# Patient Record
Sex: MALE | Race: WHITE | URBAN - METROPOLITAN AREA
[De-identification: names, ages, dates, MRNs, and addresses within clinical notes are randomized per-mention and may not be internally consistent; named-entity substitution may affect disease eponyms.]

---

## 2019-10-22 ENCOUNTER — OFFICE VISIT (OUTPATIENT)
Dept: ONCOLOGY | Age: 39
End: 2019-10-22

## 2019-10-22 VITALS
DIASTOLIC BLOOD PRESSURE: 81 MMHG | TEMPERATURE: 97.9 F | HEIGHT: 74 IN | HEART RATE: 70 BPM | BODY MASS INDEX: 34.01 KG/M2 | WEIGHT: 265 LBS | RESPIRATION RATE: 16 BRPM | OXYGEN SATURATION: 97 % | SYSTOLIC BLOOD PRESSURE: 123 MMHG

## 2019-10-22 DIAGNOSIS — Z86.718 HISTORY OF DVT OF LOWER EXTREMITY: ICD-10-CM

## 2019-10-22 DIAGNOSIS — Z82.49 FAMILY HISTORY OF DVT: ICD-10-CM

## 2019-10-22 DIAGNOSIS — I82.811 ACUTE SUPERFICIAL VENOUS THROMBOSIS OF RIGHT LOWER EXTREMITY: Primary | ICD-10-CM

## 2019-10-22 RX ORDER — RIVAROXABAN 15 MG-20MG
KIT ORAL
Refills: 0 | COMMUNITY
Start: 2019-09-28

## 2019-10-22 NOTE — PROGRESS NOTES
[id number] [facility] Oncology at [address] [place] [address]  [phone]    Hematology / Oncology Consult    Reason for Visit:   Patrick Morrison is a 41 y.o. male who is seen in consultation at the request of Dr. Concepcion Boothe for evaluation of superficial venous thrombosis. History of Present Illness:   Patrick Morrison is a 41 y.o. male who comes in today for evaluation of superficial blood clot. He recently went to [facility] ER on 9/28/19 with R calf/ankle pain which progressed to feeling a knot and swelling. He was found to have a extensive superficial venous thrombosis in his R lower leg. He was started on Xarelto and advised to see a Hematologist. He reports h/o prior RLE DVT in 2/2013. At that time, this was not thought to be associated with any surgeries, travel, testosterone injections, no h/o malignancy. He was treated with Lovenox injections as a bridge for Warfarin. He states he continued Warfarin for 90 days and then discontinued. His father had an ankle DVT related to an injury. He does not get much exercise and has a somewhat sedentary job. His symptoms of pain and swelling resolved 1-2 weeks after starting Xarelto. Father passed from lung cancer in 2016 (related to smoking). No past medical history on file. No past surgical history on file. Social History     Tobacco Use    Smoking status: Never Smoker    Smokeless tobacco: Never Used   Substance Use Topics    Alcohol use: Yes     Comment: occasional      Family History   Problem Relation Age of Onset    Cancer Father      Current Outpatient Medications   Medication Sig    XARELTO 15 mg (42)- 20 mg (9) DsPk FOLLOW DIRECTIONS ON PACKAGE     No current facility-administered medications for this visit. No Known Allergies     Review of Systems: A complete review of systems was obtained, negative except as described above.     Physical Exam:     Visit Vitals  /81   Pulse 70   Temp 97.9 °F (36.6 °C) (Oral)   Resp 16   Ht 6' 2\" (1.88 m)   Wt 265 lb (120.2 kg)   SpO2 97%   BMI 34.02 kg/m²     ECOG PS: 0  General: Well developed, no acute distress  Eyes: PERRLA, EOMI, anicteric sclerae  HENT: Atraumatic, OP clear, TMs intact without erythema  Neck: Supple  Lymphatic: No cervical, supraclavicular, axillary or inguinal adenopathy  Respiratory: CTAB, normal respiratory effort  CV: Normal rate, regular rhythm, no murmurs, no peripheral edema  GI: Soft, nontender, nondistended, no masses, no hepatomegaly, no splenomegaly  MS: Normal gait and station. Digits without clubbing or cyanosis. Skin: No rashes, ecchymoses, or petechiae. Normal temperature, turgor, and texture. Neuro/Psych: Alert, oriented. 5/5 strength in all 4 extremities. Appropriate affect, normal judgment/insight. Results:   No results found for: WBC, HGB, HCT, PLT, MCV, ANEU, HGBPOC, HCTPOC, HGBEXT, HCTEXT, PLTEXT  No results found for: NA, K, CL, CO2, GLU, BUN, CREA, GFRAA, GFRNA, CA, NAPOC, KPOCT, CLPOC, GLUCPOC, IBUN, CREAPOC, ICAI  No results found for: TBILI, ALT, SGOT, AP, TP, ALB, GLOB  No results found for: IRON, FE, TIBC, IBCT, PSAT, FERR    No results found for: B12LT, FOL, RBCF  No results found for: TSH, TSH2, TSH3, TSHP, TSHEXT  No results found for: HAMAT, HAAB, HABT, HAAT, HBSAG, HBSB, HBSAT, HBABN, HBCM, HBCAB, HBCAT, XBCABS, HBEAB, HBEAG, XHEPCS, [id number], [facility], [facility], [facility], [address] [facility], [id number], [id number], [address] [place] [address], [id number], HBCMLT, [id number], HCGAT      Imaging:     Radiology report(s) reviewed. Assessment & Plan:   Rosy Dhillon is a 41 y.o. male with h/o RLE DVT comes in for evaluation and management of RLE superficial venous thrombosis. 1. Superficial venous thrombosis in RLE: I discussed with patient that superficial venous thromboses do not necessarily needed to be treated with anticoagulation, can be monitored, treated with Aspirin or anticoagulation depending on history and symptoms.  As this patient has h/o prior DVT and pt was symptomatic, he was started on Xarelto by the ER. I feel he can continue this medication for 2 months. Given h/o prior RLE DVT which was unprovoked, I recommend thrombophilia workup, which we will complete in Jan 2020 once he has completed Xarelto (must be off medication for at least 14 days). -- Request ultrasound report from [facility].  -- Continue anticoagulation for another 2 months, which will be completed approx 12/27/19.  -- Check labs for inherited thrombophilias on approx 1/13/20  -- Return approx 1/27/20 to review results    2. H/o unprovoked RLE DVT: Will test for inherited thrombophilias    3. Family h/o DVT: Was provoked. I appreciate the opportunity to participate in Mr. Nneka Garcia's care.     Signed By: Alvy Fleischer, MD     October 22, 2019

## 2019-10-22 NOTE — PROGRESS NOTES
Liv Dickerson is a 41 y.o. male here today for evaluation of superficial blood clot. Patient reports clot in right lower leg between calf & ankle.

## 2019-10-22 NOTE — PATIENT INSTRUCTIONS
We discussed that given your superficial vein thrombosis can be treated with Aspirin or a blood thinner like Xarelto. You can continue Xarelto for another 2 months, which will be completed approx 12/27/19. I am giving you a lab sheet that I would like you to take to [facility] approx 1/13/20. You don't need to be fasting for these labs. These results will take 2 weeks to result. So, I would like to see you back on approx 1/27/20.

## 2020-01-13 DIAGNOSIS — I82.811 ACUTE SUPERFICIAL VENOUS THROMBOSIS OF RIGHT LOWER EXTREMITY: ICD-10-CM

## 2020-01-22 NOTE — PROGRESS NOTES
[address] [facility] at [facility]  [phone]    Hematology / Oncology Established Visit    Reason for Visit:   Jose Manuel Chatman is a 42 y.o. male who comes in for f/u of superficial venous thrombosis. Initially referred by Dr. Dick Salvador. History of Present Illness:   Jose Manuel Chatman is a 42 y.o. male who comes in today for evaluation of superficial blood clot. He recently went to [facility] ER on 9/28/19 with R calf/ankle pain which progressed to feeling a knot and swelling. He was found to have a extensive superficial venous thrombosis in his R lower leg. He was started on Xarelto and advised to see a Hematologist. He reports h/o prior RLE DVT in 2/2013. At that time, this was not thought to be associated with any surgeries, travel, testosterone injections, no h/o malignancy. He was treated with Lovenox injections as a bridge for Warfarin. He states he continued Warfarin for 90 days and then discontinued. His father had an ankle DVT related to an injury. He does not get much exercise and has a somewhat sedentary job. His symptoms of pain and swelling resolved 1-2 weeks after starting Xarelto. Father passed from lung cancer in 2016 (related to smoking). No past medical history on file. No past surgical history on file. Social History     Tobacco Use    Smoking status: Never Smoker    Smokeless tobacco: Never Used   Substance Use Topics    Alcohol use: Yes     Comment: occasional      Family History   Problem Relation Age of Onset    Cancer Father      Current Outpatient Medications   Medication Sig    XARELTO 15 mg (42)- 20 mg (9) DsPk FOLLOW DIRECTIONS ON PACKAGE    rivaroxaban (XARELTO) 20 mg tab tablet Take 1 Tab by mouth daily (with breakfast). No current facility-administered medications for this visit. No Known Allergies     Review of Systems: A complete review of systems was obtained, negative except as described above.     Physical Exam: There were no vitals taken for this visit. ECOG PS: 0  General: Well developed, no acute distress  Eyes: PERRLA, EOMI, anicteric sclerae  HENT: Atraumatic, OP clear, TMs intact without erythema  Neck: Supple  Lymphatic: No cervical, supraclavicular, axillary or inguinal adenopathy  Respiratory: CTAB, normal respiratory effort  CV: Normal rate, regular rhythm, no murmurs, no peripheral edema  GI: Soft, nontender, nondistended, no masses, no hepatomegaly, no splenomegaly  MS: Normal gait and station. Digits without clubbing or cyanosis. Skin: No rashes, ecchymoses, or petechiae. Normal temperature, turgor, and texture. Neuro/Psych: Alert, oriented. 5/5 strength in all 4 extremities. Appropriate affect, normal judgment/insight. Results:   No results found for: WBC, HGB, HCT, PLT, MCV, ANEU, HGBPOC, HCTPOC, HGBEXT, HCTEXT, PLTEXT, HGBEXT, HCTEXT, PLTEXT  No results found for: NA, K, CL, CO2, GLU, BUN, CREA, GFRAA, GFRNA, CA, NAPOC, KPOCT, CLPOC, GLUCPOC, IBUN, CREAPOC, ICAI  No results found for: TBILI, ALT, SGOT, AP, TP, ALB, GLOB  No results found for: IRON, FE, TIBC, IBCT, PSAT, FERR    No results found for: B12LT, FOL, RBCF  No results found for: TSH, TSH2, TSH3, TSHP, TSHEXT, TSHEXT  No results found for: HAMAT, HAAB, HABT, HAAT, HBSAG, HBSB, HBSAT, HBABN, HBCM, HBCAB, HBCAT, XBCABS, [address], [facility], XHEPCS, [id number], [address] [facility], HBCLT, [address] [facility], [id number], [id number], [address] [facility], [id number], HBCMLT, [id number], HCGAT        Imaging:   US venous duplex 10/22/19 ([facility] ER): Impression:  1. No evidence of DVT of the right lower extremity  2. Thrombosis of the small saphenous vein consistent with superficial thrombophlebitis. Assessment & Plan:   Deangelo Tracey is a 42 y.o. male with h/o RLE DVT comes in for evaluation and management of RLE superficial venous thrombosis.      1. H/o unprovoked RLE DVT / Heterozygous Prothrombin gene mutation: Given h/o prior RLE DVT which was unprovoked, I recommended a thrombophilia workup. Testing was notable for heterozygous Prothrombin gene mutation. Negative for Factor V Leiden, Protein C/S deficiency, Antithrombin III deficiency. The Heterozygous Prothrombin gene mutation only increases risk of VTE 3-4 fold compared to the general population (8% vs 2%, respectively). This risk is not considered high enough to warrant life-long prophylactic anticoagulation for most patients. The patient was educated today about the risk factors of VTE that should be avoided, and the signs and symptoms of VTE that should warrant prompt medical attention. Prophylactic anticoagulation should be considered during high risk situations, such as surgery. If patient ever has a 2nd DVT, he will need to remain on anticoagulation indefinitely. -- f/u antiphospholipid Ab panel which is pending - call pt with results  -- Follow up as needed and prior to any surgeries to discuss risk reduction strategies. 2.  H/o Superficial venous thrombosis in RLE: I discussed with patient that superficial venous thromboses do not necessarily needed to be treated with anticoagulation, can be monitored, treated with Aspirin or anticoagulation depending on history and symptoms. As this patient has h/o prior DVT and pt was symptomatic, he was started on Xarelto by the ER. He has completed 2-3 months of anticoagulation. 3. Family h/o DVT: Was provoked. I appreciate the opportunity to participate in Mr. Vertie Cheadle Parker's care.     Signed By: Keeley Mata MD     January 23, 2020

## 2020-01-23 ENCOUNTER — OFFICE VISIT (OUTPATIENT)
Dept: ONCOLOGY | Age: 40
End: 2020-01-23

## 2020-01-23 VITALS
RESPIRATION RATE: 16 BRPM | TEMPERATURE: 98 F | HEART RATE: 64 BPM | SYSTOLIC BLOOD PRESSURE: 120 MMHG | HEIGHT: 74 IN | DIASTOLIC BLOOD PRESSURE: 84 MMHG | BODY MASS INDEX: 33.24 KG/M2 | WEIGHT: 259 LBS | OXYGEN SATURATION: 97 %

## 2020-01-23 DIAGNOSIS — I82.811 ACUTE SUPERFICIAL VENOUS THROMBOSIS OF RIGHT LOWER EXTREMITY: Primary | ICD-10-CM

## 2020-01-23 DIAGNOSIS — Z82.49 FAMILY HISTORY OF DVT: ICD-10-CM

## 2020-01-23 DIAGNOSIS — Z86.718 HISTORY OF DVT OF LOWER EXTREMITY: ICD-10-CM

## 2020-01-23 DIAGNOSIS — D68.52 PROTHROMBIN GENE MUTATION (HCC): ICD-10-CM

## 2020-01-23 NOTE — PROGRESS NOTES
Jagruti Arvealo is a 42 y.o. male here today for follow up of superficial venous thrombosis, history of DVT. States he is not taking Xarelto.

## 2020-01-29 LAB
APTT HEX PL PPP: 0 SEC
APTT IMM NP PPP: ABNORMAL SEC
APTT PPP 1:1 SALINE: ABNORMAL SEC
APTT PPP: 26.2 SEC
AT III ACT/NOR PPP CHRO: 114 % (ref 75–135)
B2 GLYCOPROT1 IGA SER-ACNC: <10 SAU
B2 GLYCOPROT1 IGG SER-ACNC: <10 SGU
B2 GLYCOPROT1 IGM SER-ACNC: <10 SMU
BASOPHILS # BLD AUTO: 0 X10E3/UL (ref 0–0.2)
BASOPHILS NFR BLD AUTO: 1 %
CARDIOLIPIN IGA SER IA-ACNC: <10 APL
CARDIOLIPIN IGG SER IA-ACNC: <10 GPL
CARDIOLIPIN IGM SER IA-ACNC: 24 MPL
CONFIRM DRVVT: 32.5 SEC
DRVVT SCREEN TO CONFIRM RATIO: 1.4 RATIO
EOSINOPHIL # BLD AUTO: 0.1 X10E3/UL (ref 0–0.4)
EOSINOPHIL NFR BLD AUTO: 1 %
ERYTHROCYTE [DISTWIDTH] IN BLOOD BY AUTOMATED COUNT: 14 % (ref 11.6–15.4)
F2 GENE MUT ANL BLD/T: ABNORMAL
F5 GENE MUT ANL BLD/T: NORMAL
HCT VFR BLD AUTO: 43.3 % (ref 37.5–51)
HGB BLD-MCNC: 14.2 G/DL (ref 13–17.7)
IMM GRANULOCYTES # BLD AUTO: 0 X10E3/UL (ref 0–0.1)
IMM GRANULOCYTES NFR BLD AUTO: 0 %
LAC INTERPRETATION, 502038: ABNORMAL
LYMPHOCYTES # BLD AUTO: 2.7 X10E3/UL (ref 0.7–3.1)
LYMPHOCYTES NFR BLD AUTO: 38 %
MCH RBC QN AUTO: 28.3 PG (ref 26.6–33)
MCHC RBC AUTO-ENTMCNC: 32.8 G/DL (ref 31.5–35.7)
MCV RBC AUTO: 86 FL (ref 79–97)
MONOCYTES # BLD AUTO: 0.6 X10E3/UL (ref 0.1–0.9)
MONOCYTES NFR BLD AUTO: 9 %
NEUTROPHILS # BLD AUTO: 3.7 X10E3/UL (ref 1.4–7)
NEUTROPHILS NFR BLD AUTO: 51 %
PLATELET # BLD AUTO: 214 X10E3/UL (ref 150–450)
PLATELET NEUTRALIZATION 500049: 0 SEC
PROT C ACT/NOR PPP: 137 % (ref 73–180)
PROT C AG ACT/NOR PPP IA: 120 % (ref 60–150)
PROT S ACT/NOR PPP: 119 % (ref 63–140)
PROT S AG ACT/NOR PPP IA: 93 % (ref 60–150)
PROT S FREE AG ACT/NOR PPP IA: 141 % (ref 57–157)
PROTHROM IGG SERPL-ACNC: 15 G UNITS
PS IGG SER IA-ACNC: 0 GPS
PS IGM SER IA-ACNC: 16 MPS
RBC # BLD AUTO: 5.01 X10E6/UL (ref 4.14–5.8)
SCREEN DRVVT: 47.5 SEC
WBC # BLD AUTO: 7.1 X10E3/UL (ref 3.4–10.8)

## 2020-01-30 NOTE — PROGRESS NOTES
Testing is negative for antiphospholipid syndrome, but one of the 3 tests was indeterminate: Mildly positive, but not high enough to change overall results. Often times, this test is negative if we recheck in 12 weeks. At this time, I don't feel that retesting will change our management. However, if you want more information about this test, it is as follows:    Results are interpreted as indeterminate for the presence   of a lupus anticoagulant (LA). Only one phospholipid   dependent assay showed evidence of confirmation, with the   DRVVT ratio falling just above the reference interval. This   test may be falsely positive if the sample is collected   while the patient is on warfarin, direct Xa inhibitor, or   direct thrombin inhibitor therapy. Only persistent LA meet   laboratory diagnostic criteria for antiphospholipid   syndrome. To confirm or refute the presence of an LA and to   determine persistence, repeat testing in 12 or more weeks   is recommended. Ideally, repeat testing should be performed   in the absence of anticoagulant therapy. Although aCL IgM   is elevated, it does not fall into a range that meets   laboratory diagnostic criteria for antiphospholipid   syndrome. Levels may elevate transiently with certain   infections and may increase spuriously in the presence of   rheumatoid factor.

## 2020-01-31 NOTE — PROGRESS NOTES
Patient returned call. Informed him, per Dr. Jued Kothari, that the testing for antiphospholipid syndrome was negative. Explained that one of the three tests was indeterminate and mildly positive, but not high enough to change the overall results. Further explained that often times, this test is negative if rechecked in 12 weeks. Advised that Dr. Jude Kothari does not feel retesting would change her management. Patient verbalized understanding and denies any further questions or concerns at this time.

## 2020-01-31 NOTE — PROGRESS NOTES
Left message for patient via home number listed requesting that patient return call regarding his lab results. Provided office phone number for him to call back.